# Patient Record
Sex: FEMALE | Race: WHITE | NOT HISPANIC OR LATINO | Employment: FULL TIME
[De-identification: names, ages, dates, MRNs, and addresses within clinical notes are randomized per-mention and may not be internally consistent; named-entity substitution may affect disease eponyms.]

---

## 2017-07-22 ENCOUNTER — HEALTH MAINTENANCE LETTER (OUTPATIENT)
Age: 47
End: 2017-07-22

## 2019-11-03 ENCOUNTER — HEALTH MAINTENANCE LETTER (OUTPATIENT)
Age: 49
End: 2019-11-03

## 2020-11-16 ENCOUNTER — HEALTH MAINTENANCE LETTER (OUTPATIENT)
Age: 50
End: 2020-11-16

## 2021-02-07 ENCOUNTER — HEALTH MAINTENANCE LETTER (OUTPATIENT)
Age: 51
End: 2021-02-07

## 2021-09-18 ENCOUNTER — HEALTH MAINTENANCE LETTER (OUTPATIENT)
Age: 51
End: 2021-09-18

## 2022-01-08 ENCOUNTER — HEALTH MAINTENANCE LETTER (OUTPATIENT)
Age: 52
End: 2022-01-08

## 2022-03-05 ENCOUNTER — HEALTH MAINTENANCE LETTER (OUTPATIENT)
Age: 52
End: 2022-03-05

## 2022-11-20 ENCOUNTER — HEALTH MAINTENANCE LETTER (OUTPATIENT)
Age: 52
End: 2022-11-20

## 2023-04-15 ENCOUNTER — HEALTH MAINTENANCE LETTER (OUTPATIENT)
Age: 53
End: 2023-04-15

## 2024-05-25 ENCOUNTER — OFFICE VISIT (OUTPATIENT)
Dept: URGENT CARE | Facility: URGENT CARE | Age: 54
End: 2024-05-25
Payer: COMMERCIAL

## 2024-05-25 VITALS
HEART RATE: 100 BPM | SYSTOLIC BLOOD PRESSURE: 120 MMHG | WEIGHT: 229.6 LBS | RESPIRATION RATE: 18 BRPM | TEMPERATURE: 98.7 F | OXYGEN SATURATION: 96 % | DIASTOLIC BLOOD PRESSURE: 72 MMHG

## 2024-05-25 DIAGNOSIS — J02.0 STREP PHARYNGITIS: Primary | ICD-10-CM

## 2024-05-25 LAB — DEPRECATED S PYO AG THROAT QL EIA: POSITIVE

## 2024-05-25 PROCEDURE — 99203 OFFICE O/P NEW LOW 30 MIN: CPT | Performed by: FAMILY MEDICINE

## 2024-05-25 PROCEDURE — 87880 STREP A ASSAY W/OPTIC: CPT | Performed by: FAMILY MEDICINE

## 2024-05-25 RX ORDER — PENICILLIN V POTASSIUM 500 MG/1
500 TABLET, FILM COATED ORAL 2 TIMES DAILY
Qty: 20 TABLET | Refills: 0 | Status: SHIPPED | OUTPATIENT
Start: 2024-05-25 | End: 2024-06-04

## 2024-05-25 NOTE — PROGRESS NOTES
"Martha was seen today for pharyngitis.    Diagnoses and all orders for this visit:    Strep pharyngitis  -     Streptococcus A Rapid Screen w/Reflex to PCR - Clinic Collect  -     penicillin V (VEETID) 500 MG tablet; Take 1 tablet (500 mg) by mouth 2 times daily for 10 days    Advised to avoid contact with others for first 24 hours of treatment.  Discussed probiotics.  Follow-up if not improved in 24-48 hours.      Subjective   Martha Pinedo is a 53 year old is presenting for the following health issues:  Sore throat      HPI : Martha Pinedo started with a sore throat 6 days ago. She had swelling in throat with trouble swallowing.  Seemed to get a little better a first but yesterday throat was more painful/ swollen.  Feels like \"glass\" in throat when swallowing.  Tongue feels little swollen.    She has noticed that her throat swells when painting. She painted her kitchen/ bedroom about 1 week ago.  She then painted ceiling 3 days ago then symptoms got worse.  No new meds or supplements.  Has been taking Zyrtec daily this week but no help.    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  No recent travel.      Review of Systems: No fevers/ chills.  No rhinorrhea/ congestion.  No headache/ muscle aches.  Feels very fatigued today.  No rash.  No wheezing / shortness of breath.  No nausea/ abdominal pain.    Patient Active Problem List   Diagnosis    Major Depressive Disorder, Recurrent Episode, Moderate    Conductive hearing loss    Dyspnea and respiratory abnormality    Malaise and fatigue    Objective tinnitus    Optic papillitis    History of  section    Rubella: Indeterminate    Encounter for supervision of other normal pregnancy    Generalized anxiety disorder    Allergic rhinitis              Objective:  /72 (BP Location: Right arm, Patient Position: Sitting, Cuff Size: Adult Large)   Pulse 100   Temp 98.7  F (37.1  C) (Tympanic)   Resp 18   Wt 104.1 kg (229 lb 9.6 oz)   LMP  " (LMP Unknown)   SpO2 96%  No acute distress.    HEENT: Head is atraumatic and/normocephalic.  PERRL.  Conjunctiva clear.  Tympanic membranes grey with normal landmarks and normal light reflexes.  No nasal discharge.  Posterior oropharynx is erythematous with yellow mucus on uvula.  Sinuses nontender.  Neck: Supple.  No lymphadenopathy or thyromegaly.  Lungs: Clear to auscultation.  No wheezing, retractions, or tachypnea.  Heart: RRR. S1 and S2 normal.  No murmurs, rubs, or gallops.  Neuro: Awake, alert, oriented x 3.  Normal strength and tone.  Normal gait.       Results for orders placed or performed in visit on 05/25/24 (from the past 24 hour(s))   Streptococcus A Rapid Screen w/Reflex to PCR - Clinic Collect    Specimen: Throat; Swab   Result Value Ref Range    Group A Strep antigen Positive (A) Negative           Keri Oshea MD

## 2024-06-16 ENCOUNTER — HEALTH MAINTENANCE LETTER (OUTPATIENT)
Age: 54
End: 2024-06-16

## 2025-06-21 ENCOUNTER — HEALTH MAINTENANCE LETTER (OUTPATIENT)
Age: 55
End: 2025-06-21